# Patient Record
Sex: FEMALE | Race: WHITE
[De-identification: names, ages, dates, MRNs, and addresses within clinical notes are randomized per-mention and may not be internally consistent; named-entity substitution may affect disease eponyms.]

---

## 2019-02-13 ENCOUNTER — HOSPITAL ENCOUNTER (EMERGENCY)
Dept: HOSPITAL 89 - ER | Age: 19
Discharge: HOME | End: 2019-02-13
Payer: COMMERCIAL

## 2019-02-13 VITALS — SYSTOLIC BLOOD PRESSURE: 99 MMHG | DIASTOLIC BLOOD PRESSURE: 67 MMHG

## 2019-02-13 DIAGNOSIS — J09.X2: Primary | ICD-10-CM

## 2019-02-13 PROCEDURE — 99283 EMERGENCY DEPT VISIT LOW MDM: CPT

## 2019-02-13 PROCEDURE — 87502 INFLUENZA DNA AMP PROBE: CPT

## 2019-02-13 PROCEDURE — 71046 X-RAY EXAM CHEST 2 VIEWS: CPT

## 2019-02-13 NOTE — RADIOLOGY IMAGING REPORT
FACILITY: [facility] 

 

PATIENT NAME: Marsha Borges

: 2000

MR: [id number]

V: [id number]

EXAM DATE: 

ORDERING PHYSICIAN: COLE ADAMS

TECHNOLOGIST: 

 

Location: [facility]

Patient: Marsha Borges

: 2000

MRN: [id number]

Visit/Account:[id number]

Date of Sevice:  2019

 

ACCESSION #: [id number]

 

Study: Frontal and lateral views of the chest

 

Indication: Shortness of breath, cough, malaise, chills, chest tightness

 

Comparison study: None

 

Findings: PA and lateral views of the chest demonstrate no evidence of acute infiltrate.

 

There is no evidence of pleural effusion.

 

There is no evidence of pneumothorax.

 

The mediastinal, cardiac, and diaphragmatic contours are unremarkable.

 

The visualized bony structures are unremarkable.

 

IMPRESSION: Unremarkable chest.

 

Report Dictated By: Chidi Cabello at 2019 11:32 AM

 

Report E-Signed By: Chidi Cabello  at 2019 11:32 AM

 

WSN:[id number]

## 2019-02-13 NOTE — ER REPORT
History and Physical


Time Seen By MD:  11:02


HPI/ROS


CHIEF COMPLAINT: Cough general malaise





HISTORY OF PRESENT ILLNESS: Otherwise healthy 18-year-old female comes in with a


nonproductive cough that she has a burning in her chest general malaise doesn't 


feel very went to an outside care facility had a negative flu swab came in today


because her symptoms have not gotten worse correction has not got better with 


Robitussin. Denies any nausea vomiting diarrhea since her cough is nonproductive


no recent travel or known sick contacts no additional complaints noted





REVIEW OF SYSTEMS:


Respiratory: Cough no shortness of breath


Cardiovascular: No chest pain, no palpitations.


Gastrointestinal: No vomiting, no abdominal pain.


Musculoskeletal: No back pain.


Remainder of the 14 system rev:  Yes


Allergies:  


Coded Allergies:  


     amoxicillin (Verified  Allergy, Unknown, 2/13/19)


     clavulanic acid (Verified  Allergy, Unknown, 2/13/19)


Home Meds


No Active Prescriptions or Reported Meds


Reviewed Nurses Notes:  Yes


Old Medical Records Reviewed:  Yes


Constitutional





Vital Sign - Last 24 Hours








 2/13/19





 11:00


 


Temp 100.2


 


Pulse 144


 


Resp 20


 


B/P (MAP) 112/72


 


Pulse Ox 94


 


O2 Delivery Room Air








Physical Exam


  General Appearance: [The patient is alert, has no immediate need for airway 


protection and no current signs of toxicity.] Appears uncomfortable


Eyes: Pupils equal and round no injection.


Respiratory: Chest is non tender, lungs are clear to auscultation.


Cardiac: regular rate and rhythm [ ]


Gastrointestinal: Abdomen is soft and non tender, no masses, bowel sounds 


normal.


Musculoskeletal:  Neck: Neck is supple and non tender.


   Extremities have full range of motion and are non tender.


Skin: No rashes or lesions.


[ ]


DIFFERENTIAL DIAGNOSIS: After history and physical exam differential diagnosis 


was considered for viral upper respiratory pneumonia bronchitis influenza





Medical Decision Making


Data Points


Laboratory





Hematology








Test


 2/13/19


11:00


 


Influenza Virus Type A (PCR)


 Positive


(NEGATIVE)


 


Influenza Virus Type B (PCR)


 Negative


(NEGATIVE)








Chemistry








Test


 2/13/19


11:00


 


Influenza Virus Type A (PCR)


 Positive


(NEGATIVE)


 


Influenza Virus Type B (PCR)


 Negative


(NEGATIVE)











ED Course/Re-evaluation


ED Course


18-year-old female comes in with cough general malaise she is influenza a 


positive been symptomatic for 3 days no indication for medication symptomatic 


treatment only follow-up with primary care


Decision to Disposition Date:  Feb 13, 2019


Decision to Disposition Time:  11:45





Depart


Departure


Latest Vital Signs





Vital Signs








  Date Time  Temp Pulse Resp B/P (MAP) Pulse Ox O2 Delivery O2 Flow Rate FiO2


 


2/13/19 11:00 100.2 144 20 112/72 94 Room Air  








Impression:  


   Primary Impression:  


   Influenza


Condition:  Improved


Disposition:  HOME OR SELF-CARE


Referrals:  


SUSY ODONNELL MD


5 Days


New Scripts


No Active Prescriptions or Reported Meds


Patient Instructions:  Influenza (DC)











COLE ADAMS MD           Feb 13, 2019 11:03

## 2019-02-19 ENCOUNTER — HOSPITAL ENCOUNTER (EMERGENCY)
Dept: HOSPITAL 89 - ER | Age: 19
Discharge: HOME | End: 2019-02-19
Payer: COMMERCIAL

## 2019-02-19 VITALS — SYSTOLIC BLOOD PRESSURE: 102 MMHG | DIASTOLIC BLOOD PRESSURE: 75 MMHG

## 2019-02-19 DIAGNOSIS — J11.1: Primary | ICD-10-CM

## 2019-02-19 DIAGNOSIS — R05: ICD-10-CM

## 2019-02-19 LAB — PLATELET COUNT, AUTOMATED: 222 K/UL (ref 150–450)

## 2019-02-19 PROCEDURE — 94640 AIRWAY INHALATION TREATMENT: CPT

## 2019-02-19 PROCEDURE — 84460 ALANINE AMINO (ALT) (SGPT): CPT

## 2019-02-19 PROCEDURE — 82247 BILIRUBIN TOTAL: CPT

## 2019-02-19 PROCEDURE — 82310 ASSAY OF CALCIUM: CPT

## 2019-02-19 PROCEDURE — 82435 ASSAY OF BLOOD CHLORIDE: CPT

## 2019-02-19 PROCEDURE — 84520 ASSAY OF UREA NITROGEN: CPT

## 2019-02-19 PROCEDURE — 84155 ASSAY OF PROTEIN SERUM: CPT

## 2019-02-19 PROCEDURE — 99283 EMERGENCY DEPT VISIT LOW MDM: CPT

## 2019-02-19 PROCEDURE — 96360 HYDRATION IV INFUSION INIT: CPT

## 2019-02-19 PROCEDURE — 82040 ASSAY OF SERUM ALBUMIN: CPT

## 2019-02-19 PROCEDURE — 84450 TRANSFERASE (AST) (SGOT): CPT

## 2019-02-19 PROCEDURE — 85025 COMPLETE CBC W/AUTO DIFF WBC: CPT

## 2019-02-19 PROCEDURE — 84295 ASSAY OF SERUM SODIUM: CPT

## 2019-02-19 PROCEDURE — 82947 ASSAY GLUCOSE BLOOD QUANT: CPT

## 2019-02-19 PROCEDURE — 84132 ASSAY OF SERUM POTASSIUM: CPT

## 2019-02-19 PROCEDURE — 84075 ASSAY ALKALINE PHOSPHATASE: CPT

## 2019-02-19 PROCEDURE — 82374 ASSAY BLOOD CARBON DIOXIDE: CPT

## 2019-02-19 PROCEDURE — 82565 ASSAY OF CREATININE: CPT

## 2019-02-19 PROCEDURE — 71046 X-RAY EXAM CHEST 2 VIEWS: CPT

## 2019-02-19 NOTE — ER REPORT
History and Physical


Time Seen By MD:  19:42


Hx. of Stated Complaint:  


diagnosised with flu last wed. cough getting worst, hurts to breath. has had a 


fever of 102. no meds, using a cold washrag on head


HPI/ROS


CHIEF COMPLAINT: cough and chest pain


HISTORY OF PRESENT ILLNESS: 18-year-old  female presents to ED with 


cough and chest pain. Patient was diagnosed with the flu last Wednesday. Fever, 


chills, vomiting, nausea, facial congestion have all subsided. Reports that wet 


cough is still present with chest pain. The chest pain feels like a burning 


sensation that worsens with coughing, talking, activity. Reports that she still 


experiences a mild burning sensation with rest. Reports she has not tried any 


OTC medications to treat her cough. 





REVIEW OF SYSTEMS:


Constitutional: Reports decreased appetite and decreased food intake. Denies 


fever, chills, night sweats.


HEENT: Denies sore throat, sinus congestion, vision changes, ear pain. 


Respiratory: Reports cough, inability to take a deep breath, dyspnea.


Cardiovascular: Reports chest pain with coughing, talking, activity. Denies 


heart palpitations. 


Gastrointestinal: No vomiting, no abdominal pain. Denies constipation or 


diarrhea. 


Musculoskeletal: No back pain.


Allergies:  


Coded Allergies:  


     amoxicillin (Verified  Allergy, Unknown, 2/19/19)


     clavulanic acid (Verified  Allergy, Unknown, 2/19/19)


Home Meds


Active Scripts


Benzonatate 100 Mg Cap (TESSALON PERLE 100 MG CAP) 100 Mg Capsule, 100 MG PO TID


PRN for COUGH, #15 CAP


   Prov:BARBARA RIVERA         2/19/19


Promethazine HCl/Codeine (Prometh-Codein 6.25-10 mg/5 ml) 5 Ml Syrup, 1 TSP PO 


QHS PRN for COUGH, #35 ML


   Prov:BARBARA RIVERA         2/19/19


Past Medical/Surgical History


Hx of influenza diagnosed 2/13/2019. No other significant past medical or 


surgical history.


Reviewed Nurses Notes:  Yes


Hx Smoking:  No


Smoking Status:  Never Smoker


Constitutional





Vital Sign - Last 24 Hours








 2/19/19 2/19/19 2/19/19 2/19/19





 19:30 19:30 19:42 19:50


 


Temp 97.8   


 


Pulse 76  90 84


 


Resp 16   18


 


B/P (MAP) 119/77 119/77 (91)  


 


Pulse Ox 98  94 


 


O2 Delivery Room Air   


 


    





 2/19/19 2/19/19 2/19/19 2/19/19





 19:50 19:57 20:00 20:01


 


Pulse  81  75


 


Resp    18


 


B/P (MAP)   106/76 (86) 


 


Pulse Ox 94 96  


 


O2 Delivery Room Air   





 2/19/19 2/19/19 2/19/19 2/19/19





 20:12 20:27 20:30 20:42


 


Pulse 96 82  80


 


B/P (MAP)   115/74 (88) 


 


Pulse Ox 93 96  97





 2/19/19 2/19/19 2/19/19 





 20:57 21:00 21:12 


 


Pulse 84  ??? 


 


B/P (MAP)  102/75 (84)  


 


Pulse Ox 91   








Physical Exam


General Appearance: The patient is alert, has no immediate need for airway 


protection and no current signs of toxicity.


Respiratory: Chest is non tender, lungs are clear to auscultation, but with 


decreased breath sounds throughout. 


Cardiac: regular rate and rhythm


Gastrointestinal: Abdomen is soft and non tender, no masses, bowel sounds 


normal.


Musculoskeletal:  Neck: Neck is supple and non tender.


  





DIFFERENTIAL DIAGNOSIS: After history and physical exam differential diagnosis 


was considered for pneumonia, upper respiratory tract infection, cough.





Medical Decision Making


Data Points


Result Diagram:  


2/19/19 1956 2/19/19 1956





Laboratory





Hematology








Test


 2/19/19


19:56


 


Red Blood Count


 4.83 M/uL


(4.17-5.56)


 


Mean Corpuscular Volume


 87.4 fL


(80.0-96.0)


 


Mean Corpuscular Hemoglobin


 29.5 pg


(26.0-33.0)


 


Mean Corpuscular Hemoglobin


Concent 33.7 g/dL


(32.0-36.0)


 


Red Cell Distribution Width


 12.6 %


(11.5-14.5)


 


Mean Platelet Volume


 9.5 fL


(7.2-11.1)


 


Neutrophils (%) (Auto)


 32.9 %


(39.4-72.5)


 


Lymphocytes (%) (Auto)


 55.7 %


(17.6-49.6)


 


Monocytes (%) (Auto)


 8.7 %


(4.1-12.4)


 


Eosinophils (%) (Auto)


 1.9 %


(0.4-6.7)


 


Basophils (%) (Auto)


 0.8 %


(0.3-1.4)


 


Nucleated RBC Relative Count


(auto) 0.0 /100WBC 





 


Neutrophils # (Auto)


 1.4 K/uL


(2.0-7.4)


 


Lymphocytes # (Auto)


 2.4 K/uL


(1.3-3.6)


 


Monocytes # (Auto)


 0.4 K/uL


(0.3-1.0)


 


Eosinophils # (Auto)


 0.1 K/uL


(0.0-0.5)


 


Basophils # (Auto)


 0.0 K/uL


(0.0-0.1)


 


Nucleated RBC Absolute Count


(auto) 0.00 K/uL 





 


Sodium Level


 141 mmol/L


(137-145)


 


Potassium Level


 4.0 mmol/L


(3.5-5.0)


 


Chloride Level


 106 mmol/L


()


 


Carbon Dioxide Level


 25 mmol/L


(22-31)


 


Blood Urea Nitrogen 7 mg/dl (7-18) 


 


Creatinine


 0.90 mg/dl


(0.52-1.04)


 


Glomerular Filtration Rate


Calc > 60.0 





 


Random Glucose


 84 mg/dl


()


 


Calcium Level


 9.4 mg/dl


(8.4-10.2)


 


Total Bilirubin


 0.2 mg/dl


(0.2-1.3)


 


Aspartate Amino Transf


(AST/SGOT) 19 U/L (0-35) 





 


Alanine Aminotransferase


(ALT/SGPT) 22 U/L (0-56) 





 


Alkaline Phosphatase 75 U/L (0-126) 


 


Total Protein


 8.0 g/dl


(6.3-8.2)


 


Albumin


 4.6 g/dl


(3.5-5.0)








Chemistry








Test


 2/19/19


19:56


 


White Blood Count


 4.3 k/uL


(4.5-11.0)


 


Red Blood Count


 4.83 M/uL


(4.17-5.56)


 


Hemoglobin


 14.2 g/dL


(12.0-16.0)


 


Hematocrit


 42.2 %


(34.0-47.0)


 


Mean Corpuscular Volume


 87.4 fL


(80.0-96.0)


 


Mean Corpuscular Hemoglobin


 29.5 pg


(26.0-33.0)


 


Mean Corpuscular Hemoglobin


Concent 33.7 g/dL


(32.0-36.0)


 


Red Cell Distribution Width


 12.6 %


(11.5-14.5)


 


Platelet Count


 222 K/uL


(150-450)


 


Mean Platelet Volume


 9.5 fL


(7.2-11.1)


 


Neutrophils (%) (Auto)


 32.9 %


(39.4-72.5)


 


Lymphocytes (%) (Auto)


 55.7 %


(17.6-49.6)


 


Monocytes (%) (Auto)


 8.7 %


(4.1-12.4)


 


Eosinophils (%) (Auto)


 1.9 %


(0.4-6.7)


 


Basophils (%) (Auto)


 0.8 %


(0.3-1.4)


 


Nucleated RBC Relative Count


(auto) 0.0 /100WBC 





 


Neutrophils # (Auto)


 1.4 K/uL


(2.0-7.4)


 


Lymphocytes # (Auto)


 2.4 K/uL


(1.3-3.6)


 


Monocytes # (Auto)


 0.4 K/uL


(0.3-1.0)


 


Eosinophils # (Auto)


 0.1 K/uL


(0.0-0.5)


 


Basophils # (Auto)


 0.0 K/uL


(0.0-0.1)


 


Nucleated RBC Absolute Count


(auto) 0.00 K/uL 





 


Glomerular Filtration Rate


Calc > 60.0 





 


Calcium Level


 9.4 mg/dl


(8.4-10.2)


 


Total Bilirubin


 0.2 mg/dl


(0.2-1.3)


 


Aspartate Amino Transf


(AST/SGOT) 19 U/L (0-35) 





 


Alanine Aminotransferase


(ALT/SGPT) 22 U/L (0-56) 





 


Alkaline Phosphatase 75 U/L (0-126) 


 


Total Protein


 8.0 g/dl


(6.3-8.2)


 


Albumin


 4.6 g/dl


(3.5-5.0)











EKG/Imaging


Imaging


Technique: CHEST PA LAT


 


HISTORY: cough, influenza positive x1 week


 


COMPARISON: Chest radiograph 2/13/2019


 


Findings: The lungs are clear.  No pleural effusion or pneumothorax.  The 


cardiomediastinal silhouette is normal.


 


Impression:


1.  No acute cardiopulmonary process.


 


Report Dictated By: Winston Gallegos DO at 2/19/2019 8:30 PM


 


Report E-Signed By: Winston Gallegos DO  at 2/19/2019 8:30 PM





ED Course/Re-evaluation


ED Course


Patient was admitted to an exam room, history and physical was performed, 


differential diagnoses considered. Productive cough, burning chest pain, and 


decreased breath sounds noted with review of systems and exam. IV started with 


NS for hydration. Labs drawn. Chest x-ray performed to rule out pneumonia. 


Duoneb given; patient reports that the Duoneb did not help her symptoms. Chest 


x-ray with no cardiopulmonary processes. CBC likely consistent with viral 


infection. Plan discussed with patient. Plan to DC home with codeine cough syrup


at night and tessalon pearls during the day to help with cough. Encouraged 


saline rinses to help with dry nose and congestion.


Decision to Disposition Date:  Feb 19, 2019


Decision to Disposition Time:  21:07





Depart


Departure


Latest Vital Signs





Vital Signs








  Date Time  Temp Pulse Resp B/P (MAP) Pulse Ox O2 Delivery O2 Flow Rate FiO2


 


2/19/19 21:12  ???      


 


2/19/19 21:00    102/75 (84)    


 


2/19/19 20:57     91   


 


2/19/19 20:01   18     


 


2/19/19 19:50      Room Air  


 


2/19/19 19:30 97.8       








Impression:  


   Primary Impression:  


   Influenza


   Additional Impression:  


   Cough


Condition:  Improved


Disposition:  HOME OR SELF-CARE


New Scripts


Benzonatate 100 Mg Cap (TESSALON PERLE 100 MG CAP) 100 Mg Capsule


100 MG PO TID PRN for COUGH, #15 CAP


   Prov: BARBARA RIVERA         2/19/19 


Promethazine HCl/Codeine (Prometh-Codein 6.25-10 mg/5 ml) 5 Ml Syrup


1 TSP PO QHS PRN for COUGH, #35 ML


   Prov: BARBARA RIVERA         2/19/19


Patient Instructions:  Acute Cough (ED)





Additional Instructions:  


Increase fluid intake.


Get plenty of rest.


Follow up with your primary care provider.


Return to the ER if condition worsens.


Take medication as prescribed.





Problem Qualifiers











BARBARA RIVERA                Feb 19, 2019 19:42

## 2019-02-19 NOTE — RADIOLOGY IMAGING REPORT
FACILITY: [facility] 

 

PATIENT NAME: Marsha Borges

: 2000

MR: [id number]

V: [id number]

EXAM DATE: 

ORDERING PHYSICIAN: BARBARA RIVERA

TECHNOLOGIST: 

 

Location: [facility]

Patient: Marsha Borges

: 2000

MRN: [id number]

Visit/Account:[id number]

Date of Sevice:  2019

 

ACCESSION #: [id number]

 

Technique: CHEST PA LAT

 

HISTORY: cough, influenza positive x1 week

 

COMPARISON: Chest radiograph 2019

 

Findings: The lungs are clear.  No pleural effusion or pneumothorax.  The cardiomediastinal silhouett
e is normal.

 

Impression:

1.  No acute cardiopulmonary process.

 

Report Dictated By: Winston Gallegos DO at 2019 8:30 PM

 

Report E-Signed By: Winston Gallegos DO  at 2019 8:30 PM

 

WSN:[id number]

## 2019-04-15 ENCOUNTER — HOSPITAL ENCOUNTER (EMERGENCY)
Dept: HOSPITAL 89 - ER | Age: 19
Discharge: HOME | End: 2019-04-15
Payer: COMMERCIAL

## 2019-04-15 VITALS — SYSTOLIC BLOOD PRESSURE: 102 MMHG | DIASTOLIC BLOOD PRESSURE: 70 MMHG
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
## 2019-04-15 DIAGNOSIS — F07.81: Primary | ICD-10-CM

## 2019-04-15 PROCEDURE — 99281 EMR DPT VST MAYX REQ PHY/QHP: CPT
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
